# Patient Record
Sex: MALE | Race: BLACK OR AFRICAN AMERICAN
[De-identification: names, ages, dates, MRNs, and addresses within clinical notes are randomized per-mention and may not be internally consistent; named-entity substitution may affect disease eponyms.]

---

## 2018-03-11 ENCOUNTER — HOSPITAL ENCOUNTER (EMERGENCY)
Dept: HOSPITAL 45 - C.EDB | Age: 23
LOS: 1 days | Discharge: HOME | End: 2018-03-12
Payer: COMMERCIAL

## 2018-03-11 VITALS
BODY MASS INDEX: 31.46 KG/M2 | WEIGHT: 245.15 LBS | BODY MASS INDEX: 31.46 KG/M2 | WEIGHT: 245.15 LBS | HEIGHT: 74.02 IN | HEIGHT: 74.02 IN

## 2018-03-11 VITALS — TEMPERATURE: 98.24 F

## 2018-03-11 DIAGNOSIS — M54.5: Primary | ICD-10-CM

## 2018-03-11 NOTE — EMERGENCY ROOM VISIT NOTE
History


Report prepared by Edis:  Seth Melara


Under the Supervision of:  Dr. Esha Arroyo D.O.


First contact with patient:  23:08


Chief Complaint:  BACK PAIN


Stated Complaint:  BACK PAIN





History of Present Illness


The patient is a 22 year old male who presents to the Emergency Room with 

complaints of constant right-sided back pain beginning this morning. The 

patient states that he has been having intermittent back pain for the past 

year. He notes that his pain is located on his right side and runs up his 

spine. He reports that his pain typically hurts for a night and then resolves 

itself when he wakes up the next day. The patient states that his back pain was 

also bad a week ago before he went to [place]. He denies any urinary symptoms

, changes in his bowel movements, buttock pain, and numbness in his legs. He 

notes that he has tried taking ibuprofen and Tylenol with no relief of his 

symptoms. He reports that he was given cyclobenzaprine Flexeril 5 months ago 

for his symptoms, which he only uses when his pain is really bad. The patient 

states that he took a dose of his cyclobenzaprine Flexeril about 3 hours ago. 

He rates his current pain as a 9.5/10. The patient states that he has not 

noticed any triggers that cause his back pain.





   Source of History:  patient


   Onset:  this morning


   Position:  back (right-sided)


   Symptom Intensity:  9.5/10


   Timing:  constant


   Associated Symptoms:  No urinary symptoms, No numbness (in his legs)


Note:


The patient also denies any changes in his bowel movements and buttock pain.





Review of Systems


See HPI for pertinent positives & negatives. A total of 6 systems reviewed and 

were otherwise negative.





Past Medical & Surgical


Medical Problems:


(1) No chronic problems








Family History





Patient reports no known family medical history.





Social History


Smoking Status:  Never Smoker


Alcohol Use:  none


Marital Status:  single


Housing Status:  lives with family


Occupation Status:  [employer] student





Current/Historical Medications


Scheduled


Cyclobenzaprine Hcl (Flexeril), 1 TAB PO TID





Allergies


Coded Allergies:  


     No Known Allergies (Unverified , 3/11/18)





Physical Exam


Vital Signs











  Date Time  Temp Pulse Resp B/P (MAP) Pulse Ox O2 Delivery O2 Flow Rate FiO2


 


3/12/18 00:49  65 16 146/68 98 Room Air  


 


3/11/18 23:05 36.8 98 18 131/75 94 Room Air  











Physical Exam


BACK: Visual and palpable right lumbar paraspinous muscle spasm in L2-L4, 


Extremities: Normal strength in both lower extremities, normal patellar 

reflexes.





Medical Decision & Procedures


Medications Administered











 Medications


  (Trade)  Dose


 Ordered  Sig/Landen


 Route  Start Time


 Stop Time Status Last Admin


Dose Admin


 


 Ketorolac


 Tromethamine


  (Toradol Inj)  60 mg  NOW  STAT


 IM  3/11/18 23:54


 3/11/18 23:56 DC 3/12/18 00:06


60 MG


 


 Oxycodone/


 Acetaminophen


  (Percocet


 5-325mg Tab)  2 tab  NOW  ONCE


 PO  3/12/18 00:00


 3/12/18 00:01 DC 3/12/18 00:06


2 TAB











Procedure


2354: Toradol Inj 60mg IM





0000: Oxycodone/Acetaminophen 2 tab PO





ED Course


2347: Past medical records reviewed. The patient was evaluated in room B4. A 

complete history and physical exam was performed.  





2354: Toradol Inj 60mg IM





0000: Oxycodone/Acetaminophen 2 tab PO.  I reviewed lumbar spine films from 2 

years ago which were normal.





0052: I reevaluated and updated the patient. He is feeling better and would 

like to leave.





0114: Upon reevaluation, the patient was stable. I discussed findings and 

results with him. He verbalized agreement of the treatment plan. The patient 

was discharged home.





Medical Decision


The patient is a 22 year old male who presents to the Emergency Room with 

complaints of right-sided constant back pain beginning this morning. 

Differential diagnoses include sciatica, lumbar spinal stenosis, lumbar 

paraspinous muscle spasm, kidney stone, and UTI.





This is a 22-year-old male patient who has some discomfort to the right side of 

his back which is intermittent.  There is obvious muscle spasm in that area 

consistent with lumbar paraspinous muscle tension.





The patient got relief of his discomfort while here in the emergency 

department.  He was given additional Flexeril to use for muscle relaxation.  I 

have encouraged him to apply heat to that side of his back into possibly get 

massage therapy.  He was told return here to the emergency department if he had 

any worsening symptoms.  When he is feeling better, he should do lumbar spine 

stretches to possibly prevent these intermittent episodes of pain.





Medication Reconcilliation


Current Medication List:  was personally reviewed by me





Blood Pressure Screening


Patient's blood pressure:  Elevated blood pressure


Blood pressure disposition:  Elevated BP felt to be situational (secondary to 

pain)





Impression





 Primary Impression:  


 Lumbar back pain





Scribe Attestation


The scribe's documentation has been prepared under my direction and personally 

reviewed by me in its entirety. I confirm that the note above accurately 

reflects all work, treatment, procedures, and medical decision making performed 

by me.





Departure Information


Dispostion


Home / Self-Care





Prescriptions





Cyclobenzaprine Hcl (FLEXERIL) 10 Mg Tab


1 TAB PO TID for 10 Days, #30 TAB


   Prov: Esha Arroyo D.O.         3/12/18





Referrals


 (PCP)





Forms


HOME CARE DOCUMENTATION FORM,                                                 

               IMPORTANT VISIT INFORMATION





Patient Instructions


[facility]





Additional Instructions





Rest.





Apply heat to the right side of your back.





Motrin - 800-1000mg motrin every 8 hours with food for a max of 3 days when 

needed for pain.





Flexeril - 1 tab. every 8 hours for muscle spasm.





I would suggest getting a massage and stretching your lumbar spine to avoid 

these episodes

## 2018-03-12 VITALS — DIASTOLIC BLOOD PRESSURE: 68 MMHG | OXYGEN SATURATION: 98 % | SYSTOLIC BLOOD PRESSURE: 146 MMHG | HEART RATE: 65 BPM
